# Patient Record
Sex: FEMALE | Race: OTHER | HISPANIC OR LATINO | URBAN - METROPOLITAN AREA
[De-identification: names, ages, dates, MRNs, and addresses within clinical notes are randomized per-mention and may not be internally consistent; named-entity substitution may affect disease eponyms.]

---

## 2021-01-15 ENCOUNTER — OFFICE VISIT (OUTPATIENT)
Dept: URBAN - METROPOLITAN AREA CLINIC 121 | Facility: CLINIC | Age: 51
End: 2021-01-15

## 2021-04-02 ENCOUNTER — OFFICE VISIT (OUTPATIENT)
Dept: URBAN - METROPOLITAN AREA CLINIC 63 | Facility: CLINIC | Age: 51
End: 2021-04-02

## 2021-04-09 ENCOUNTER — OFFICE VISIT (OUTPATIENT)
Dept: URBAN - METROPOLITAN AREA SURGERY CENTER 4 | Facility: SURGERY CENTER | Age: 51
End: 2021-04-09

## 2021-04-12 LAB — PATHOLOGY (INDENTED REPORT): (no result)

## 2021-04-22 ENCOUNTER — OFFICE VISIT (OUTPATIENT)
Dept: URBAN - METROPOLITAN AREA CLINIC 60 | Facility: CLINIC | Age: 51
End: 2021-04-22

## 2022-07-09 ENCOUNTER — TELEPHONE ENCOUNTER (OUTPATIENT)
Dept: URBAN - METROPOLITAN AREA CLINIC 121 | Facility: CLINIC | Age: 52
End: 2022-07-09

## 2022-07-09 RX ORDER — SUCRALFATE 1 G/1
TWICE A DAY TABLET ORAL TWICE A DAY
Refills: 1 | OUTPATIENT
Start: 2021-04-12 | End: 2021-04-22

## 2022-07-09 RX ORDER — PANTOPRAZOLE SODIUM 40 MG/1
ONCE A DAY GRANULE, DELAYED RELEASE ORAL ONCE A DAY
Refills: 1 | OUTPATIENT
Start: 2021-04-02 | End: 2021-04-22

## 2022-07-09 RX ORDER — PANTOPRAZOLE SODIUM 40 MG/1
ONCE A DAY GRANULE, DELAYED RELEASE ORAL ONCE A DAY
Refills: 3 | OUTPATIENT
Start: 2021-04-22 | End: 2021-05-03

## 2022-07-09 RX ORDER — SUCRALFATE 1 G/10ML
THREE TIMES A DAY SUSPENSION ORAL THREE TIMES A DAY
Refills: 0 | OUTPATIENT
Start: 2021-04-12 | End: 2021-04-22

## 2022-07-09 RX ORDER — SUCRALFATE 1 G/10ML
THREE TIMES A DAY SUSPENSION ORAL THREE TIMES A DAY
Refills: 0 | OUTPATIENT
Start: 2021-04-09 | End: 2021-04-09

## 2022-07-10 ENCOUNTER — TELEPHONE ENCOUNTER (OUTPATIENT)
Dept: URBAN - METROPOLITAN AREA CLINIC 121 | Facility: CLINIC | Age: 52
End: 2022-07-10

## 2022-07-10 RX ORDER — PANTOPRAZOLE SODIUM 40 MG/1
ONCE A DAY GRANULE, DELAYED RELEASE ORAL ONCE A DAY
Refills: 2 | Status: ACTIVE | COMMUNITY
Start: 2021-05-03

## 2022-07-10 RX ORDER — SUCRALFATE 1 G/1
TWICE A DAY TABLET ORAL TWICE A DAY
Refills: 2 | Status: ACTIVE | COMMUNITY
Start: 2021-04-22

## 2023-02-03 ENCOUNTER — OFFICE VISIT (OUTPATIENT)
Dept: URBAN - METROPOLITAN AREA CLINIC 60 | Facility: CLINIC | Age: 53
End: 2023-02-03
Payer: COMMERCIAL

## 2023-02-03 ENCOUNTER — LAB OUTSIDE AN ENCOUNTER (OUTPATIENT)
Dept: URBAN - METROPOLITAN AREA CLINIC 60 | Facility: CLINIC | Age: 53
End: 2023-02-03

## 2023-02-03 VITALS
TEMPERATURE: 97.8 F | SYSTOLIC BLOOD PRESSURE: 110 MMHG | RESPIRATION RATE: 20 BRPM | WEIGHT: 154 LBS | DIASTOLIC BLOOD PRESSURE: 70 MMHG | BODY MASS INDEX: 25.66 KG/M2 | OXYGEN SATURATION: 97 % | HEIGHT: 65 IN | HEART RATE: 79 BPM

## 2023-02-03 DIAGNOSIS — K21.9 GERD WITHOUT ESOPHAGITIS: ICD-10-CM

## 2023-02-03 DIAGNOSIS — R13.14 PHARYNGOESOPHAGEAL DYSPHAGIA: ICD-10-CM

## 2023-02-03 PROCEDURE — 99214 OFFICE O/P EST MOD 30 MIN: CPT | Performed by: NURSE PRACTITIONER

## 2023-02-03 RX ORDER — SUCRALFATE 1 G/1
1 TABLET ON AN EMPTY STOMACH TABLET ORAL TWICE A DAY
Qty: 180 TABLET | Refills: 0 | OUTPATIENT

## 2023-02-03 RX ORDER — SUCRALFATE 1 G/1
TWICE A DAY TABLET ORAL TWICE A DAY
Refills: 2 | Status: ACTIVE | COMMUNITY
Start: 2021-04-22

## 2023-02-03 RX ORDER — OMEPRAZOLE 20 MG/1
1 CAPSULE 30 MINUTES BEFORE MORNING MEAL CAPSULE, DELAYED RELEASE ORAL ONCE A DAY
Qty: 90 CAPSULES | Refills: 0 | OUTPATIENT

## 2023-02-03 RX ORDER — FAMOTIDINE 20 MG/1
1 TABLET AT BEDTIME AS NEEDED TABLET, FILM COATED ORAL ONCE A DAY
Status: ACTIVE | COMMUNITY

## 2023-02-03 NOTE — HPI-HPI
4/21 Patient here today complaining of symptoms of gastritis and reflux.  She was referred by her primary doctor for EGD and colonoscopy.  Patient never had colonoscopy done denies any personal or family history of colorectal cancer, rectal bleeding or change on her bowel habits.  She has medical history of chronic idiopathic constipation. Plan: Patient will continue with PPI, will do diet for gastritis and reflux, increase fluid and fiber intake.  Start on Metamucil daily. Colonoscopy and EGD. 4/21 Colonoscopy shows evidence of diverticular disease and internal hemorrhoids otherwise normal.  No specimens collected.  EGD shows LA grade B reflux esophagitis, small hiatal hernia and chronic gastritis.  Duodenal mucosa without specific histologic findings.  Gastric body/antrum biopsy with focal mild chronic gastritis, negative for H. pylori organism.  Lower third esophageal mucosa with mild chronic inflammation and reflux type changes. Plan: Next colonoscopy in 10 years patient will continue with diet and treatment for gastritis and reflux. 2/23 Patient is here today complaining having severe symptoms of dysphagia, gastritis, and reflux.  She is doing treatment with Carafate 1 g twice a day famotidine 20 mg once a day but her symptoms do not improve.  Patient has done EGD 2 years ago that shows evidence of LA grade A reflux esophagitis and chronic gastritis. Patient will continue with present treatment, will add diet and omeprazole 20 mg once a day to her treatment, barium swallow and EGD.
10

## 2023-02-10 ENCOUNTER — LAB OUTSIDE AN ENCOUNTER (OUTPATIENT)
Dept: URBAN - METROPOLITAN AREA CLINIC 60 | Facility: CLINIC | Age: 53
End: 2023-02-10

## 2023-02-20 PROBLEM — 40739000: Status: ACTIVE | Noted: 2023-02-03

## 2023-03-17 ENCOUNTER — CLAIMS CREATED FROM THE CLAIM WINDOW (OUTPATIENT)
Dept: URBAN - METROPOLITAN AREA SURGERY CENTER 4 | Facility: SURGERY CENTER | Age: 53
End: 2023-03-17
Payer: COMMERCIAL

## 2023-03-17 ENCOUNTER — CLAIMS CREATED FROM THE CLAIM WINDOW (OUTPATIENT)
Dept: URBAN - METROPOLITAN AREA CLINIC 4 | Facility: CLINIC | Age: 53
End: 2023-03-17
Payer: COMMERCIAL

## 2023-03-17 DIAGNOSIS — R19.8 OTHER SPECIFIED SYMPTOMS AND SIGNS INVOLVING THE DIGESTIVE SYSTEM AND ABDOMEN: ICD-10-CM

## 2023-03-17 DIAGNOSIS — K31.89 OTHER DISEASES OF STOMACH AND DUODENUM: ICD-10-CM

## 2023-03-17 DIAGNOSIS — K21.9 GERD WITHOUT ESOPHAGITIS: ICD-10-CM

## 2023-03-17 DIAGNOSIS — R13.10 DYSPHAGIA: ICD-10-CM

## 2023-03-17 DIAGNOSIS — K29.50 CHRONIC GASTRITIS WITHOUT BLEEDING: ICD-10-CM

## 2023-03-17 DIAGNOSIS — K29.70 GASTRITIS, UNSPECIFIED, WITHOUT BLEEDING: ICD-10-CM

## 2023-03-17 PROCEDURE — 88305 TISSUE EXAM BY PATHOLOGIST: CPT | Performed by: PATHOLOGY

## 2023-03-17 PROCEDURE — 88312 SPECIAL STAINS GROUP 1: CPT | Performed by: PATHOLOGY

## 2023-03-17 PROCEDURE — 43239 EGD BIOPSY SINGLE/MULTIPLE: CPT | Performed by: INTERNAL MEDICINE

## 2023-03-17 RX ORDER — OMEPRAZOLE 20 MG/1
1 CAPSULE 30 MINUTES BEFORE MORNING MEAL CAPSULE, DELAYED RELEASE ORAL ONCE A DAY
Qty: 90 CAPSULES | Refills: 0 | Status: ACTIVE | COMMUNITY

## 2023-03-17 RX ORDER — SUCRALFATE 1 G/1
TWICE A DAY TABLET ORAL TWICE A DAY
Refills: 2 | Status: ACTIVE | COMMUNITY
Start: 2021-04-22

## 2023-03-17 RX ORDER — FAMOTIDINE 20 MG/1
1 TABLET AT BEDTIME AS NEEDED TABLET, FILM COATED ORAL ONCE A DAY
Status: ACTIVE | COMMUNITY

## 2023-03-17 RX ORDER — SUCRALFATE 1 G/1
1 TABLET ON AN EMPTY STOMACH TABLET ORAL TWICE A DAY
Qty: 180 TABLET | Refills: 0 | Status: ACTIVE | COMMUNITY

## 2023-03-20 PROBLEM — 84568007 ATROPHIC GASTRITIS: Status: ACTIVE | Noted: 2023-03-20

## 2023-03-20 PROBLEM — 40739000 DYSPHAGIA: Status: ACTIVE | Noted: 2023-03-20

## 2023-04-14 ENCOUNTER — DASHBOARD ENCOUNTERS (OUTPATIENT)
Age: 53
End: 2023-04-14

## 2023-04-14 ENCOUNTER — OFFICE VISIT (OUTPATIENT)
Dept: URBAN - METROPOLITAN AREA CLINIC 60 | Facility: CLINIC | Age: 53
End: 2023-04-14
Payer: COMMERCIAL

## 2023-04-14 VITALS
OXYGEN SATURATION: 96 % | TEMPERATURE: 97.7 F | RESPIRATION RATE: 20 BRPM | DIASTOLIC BLOOD PRESSURE: 78 MMHG | BODY MASS INDEX: 25.33 KG/M2 | HEART RATE: 65 BPM | WEIGHT: 152 LBS | SYSTOLIC BLOOD PRESSURE: 120 MMHG | HEIGHT: 65 IN

## 2023-04-14 DIAGNOSIS — K21.9 GASTROESOPHAGEAL REFLUX DISEASE WITHOUT ESOPHAGITIS: ICD-10-CM

## 2023-04-14 DIAGNOSIS — K44.9 HIATAL HERNIA: ICD-10-CM

## 2023-04-14 DIAGNOSIS — M93.90 OSTEOCHONDRITIS: ICD-10-CM

## 2023-04-14 DIAGNOSIS — K29.50 CHRONIC GASTRITIS WITHOUT BLEEDING, UNSPECIFIED GASTRITIS TYPE: ICD-10-CM

## 2023-04-14 PROBLEM — 8493009: Status: ACTIVE | Noted: 2023-04-14

## 2023-04-14 PROBLEM — 266435005: Status: ACTIVE | Noted: 2023-04-14

## 2023-04-14 PROBLEM — 70736000: Status: ACTIVE | Noted: 2023-04-14

## 2023-04-14 PROCEDURE — 99214 OFFICE O/P EST MOD 30 MIN: CPT | Performed by: NURSE PRACTITIONER

## 2023-04-14 RX ORDER — SUCRALFATE 1 G/1
1 TABLET ON AN EMPTY STOMACH TABLET ORAL TWICE A DAY
Qty: 180 TABLET | Refills: 0 | OUTPATIENT
Start: 2023-04-14 | End: 2023-07-13

## 2023-04-14 RX ORDER — FAMOTIDINE 20 MG/1
1 TABLET AT BEDTIME AS NEEDED TABLET, FILM COATED ORAL ONCE A DAY
Qty: 90 TABLET | OUTPATIENT
Start: 2023-04-14

## 2023-04-14 RX ORDER — SUCRALFATE 1 G/1
TWICE A DAY TABLET ORAL TWICE A DAY
Refills: 2 | Status: ACTIVE | COMMUNITY
Start: 2021-04-22

## 2023-04-14 NOTE — PHYSICAL EXAM CHEST:
No lesions,  no deformities, chest wall tenderness over the costo -sternal joins,  no masses present, breathing is unlabored without, normal breath sounds.

## 2023-04-14 NOTE — HPI-HPI
4/21 Patient here today complaining of symptoms of gastritis and reflux.  She was referred by her primary doctor for EGD and colonoscopy.  Patient never had colonoscopy done denies any personal or family history of colorectal cancer, rectal bleeding or change on her bowel habits.  She has medical history of chronic idiopathic constipation. Plan: Patient will continue with PPI, will do diet for gastritis and reflux, increase fluid and fiber intake.  Start on Metamucil daily. Colonoscopy and EGD. 4/21 Colonoscopy shows evidence of diverticular disease and internal hemorrhoids otherwise normal.  No specimens collected.  EGD shows LA grade B reflux esophagitis, small hiatal hernia and chronic gastritis.  Duodenal mucosa without specific histologic findings.  Gastric body/antrum biopsy with focal mild chronic gastritis, negative for H. pylori organism.  Lower third esophageal mucosa with mild chronic inflammation and reflux type changes. Plan: Next colonoscopy in 10 years patient will continue with diet and treatment for gastritis and reflux. 2/23 Patient is here today complaining having severe symptoms of dysphagia, gastritis, and reflux.  She is doing treatment with Carafate 1 g twice a day famotidine 20 mg once a day but her symptoms do not improve.  Patient has done EGD 2 years ago that shows evidence of LA grade A reflux esophagitis and chronic gastritis. Patient will continue with present treatment, will add diet and omeprazole 20 mg once a day to her treatment, barium swallow and EGD.

## 2024-12-18 ENCOUNTER — OFFICE VISIT (OUTPATIENT)
Dept: URBAN - METROPOLITAN AREA CLINIC 63 | Facility: CLINIC | Age: 54
End: 2024-12-18
Payer: COMMERCIAL

## 2024-12-18 VITALS
SYSTOLIC BLOOD PRESSURE: 120 MMHG | TEMPERATURE: 97.9 F | WEIGHT: 159 LBS | OXYGEN SATURATION: 98 % | HEART RATE: 68 BPM | DIASTOLIC BLOOD PRESSURE: 80 MMHG | BODY MASS INDEX: 26.49 KG/M2 | HEIGHT: 65 IN

## 2024-12-18 DIAGNOSIS — K21.9 GERD WITHOUT ESOPHAGITIS: ICD-10-CM

## 2024-12-18 DIAGNOSIS — K29.50 ANTRAL GASTRITIS: ICD-10-CM

## 2024-12-18 DIAGNOSIS — K44.9 HIATAL HERNIA: ICD-10-CM

## 2024-12-18 DIAGNOSIS — R13.14 PHARYNGOESOPHAGEAL DYSPHAGIA: ICD-10-CM

## 2024-12-18 DIAGNOSIS — K59.09 CHRONIC CONSTIPATION: ICD-10-CM

## 2024-12-18 PROCEDURE — 99214 OFFICE O/P EST MOD 30 MIN: CPT | Performed by: INTERNAL MEDICINE

## 2024-12-18 RX ORDER — VITAMIN B COMPLEX
AS DIRECTED CAPSULE ORAL
Status: ACTIVE | COMMUNITY

## 2024-12-18 RX ORDER — OMEPRAZOLE 40 MG/1
1 CAPSULE 1/2 TO 1 HOUR BEFORE MORNING MEAL CAPSULE, DELAYED RELEASE ORAL ONCE A DAY
Qty: 30 | OUTPATIENT
Start: 2024-12-18

## 2024-12-18 NOTE — HPI-HPI
4/21 Patient here today complaining of symptoms of gastritis and reflux.  She was referred by her primary doctor for EGD and colonoscopy.  Patient never had colonoscopy done denies any personal or family history of colorectal cancer, rectal bleeding or change on her bowel habits.  She has medical history of chronic idiopathic constipation. Plan: Patient will continue with PPI, will do diet for gastritis and reflux, increase fluid and fiber intake.  Start on Metamucil daily. Colonoscopy and EGD. 4/21 Colonoscopy shows evidence of diverticular disease and internal hemorrhoids otherwise normal.  No specimens collected.  EGD shows LA grade B reflux esophagitis, small hiatal hernia and chronic gastritis.  Duodenal mucosa without specific histologic findings.  Gastric body/antrum biopsy with focal mild chronic gastritis, negative for H. pylori organism.  Lower third esophageal mucosa with mild chronic inflammation and reflux type changes. Plan: Next colonoscopy in 10 years patient will continue with diet and treatment for gastritis and reflux. 2/23 Patient is here today complaining having severe symptoms of dysphagia, gastritis, and reflux.  She is doing treatment with Carafate 1 g twice a day famotidine 20 mg once a day but her symptoms do not improve.  Patient has done EGD 2 years ago that shows evidence of LA grade A reflux esophagitis and chronic gastritis. Patient will continue with present treatment, will add diet and omeprazole 20 mg once a day to her treatment, barium swallow and EGD. 27.2

## 2024-12-18 NOTE — HPI-TODAY'S VISIT:
12/24: Patient was seen in February 2023 with personal history of acid reflux, gastritis and dysphagia with evidence of esophagitis grade a by [place] classification in the past had upper endoscopy done on March 2023 with a small hiatal hernia normal esophagus, chronic gastritis and normal duodenum pathology report showed normal duodenum, chemical reactive gastropathy and sign of acid reflux but not evidence of Kent's esophagus, dysplasia or malignancy.  With this finding there is an improvement with previous endoscopy and was recommended to continue on treatment and diet for acid reflux.  Now the patient is being seen today after almost 2 years. Patient had a CT scan of the abdomen with evidence of a 2.2 cm in the capsular area of the liver, was followed by an MRI with not a clear identified lesion in the same area reason why was recommended to do a CT scan in 6 months for further evaluation.  As per radiologist seems to be a benign lesion. Patient with chronic constipation, will do trial with linzess, and will do trial with PPI. Will follow in 3 months.

## 2024-12-18 NOTE — PHYSICAL EXAM HENT:
Head, normocephalic, atraumatic, Face, Face within normal limits, Ears, External ears within normal limits yes

## 2025-01-15 ENCOUNTER — ERX REFILL RESPONSE (OUTPATIENT)
Dept: URBAN - METROPOLITAN AREA CLINIC 63 | Facility: CLINIC | Age: 55
End: 2025-01-15

## 2025-01-15 RX ORDER — OMEPRAZOLE 40 MG/1
TAKE 1 CAPSULE BY MOUTH 30 MINUTES TO 1 HOUR BEFORE MORNING MEAL CAPSULE, DELAYED RELEASE ORAL
Qty: 30 CAPSULE | Refills: 0 | OUTPATIENT

## 2025-01-15 RX ORDER — OMEPRAZOLE 40 MG/1
1 CAPSULE 1/2 TO 1 HOUR BEFORE MORNING MEAL CAPSULE, DELAYED RELEASE ORAL ONCE A DAY
Qty: 30 | OUTPATIENT

## 2025-05-12 ENCOUNTER — OFFICE VISIT (OUTPATIENT)
Dept: URBAN - METROPOLITAN AREA CLINIC 63 | Facility: CLINIC | Age: 55
End: 2025-05-12